# Patient Record
Sex: MALE | Race: WHITE | NOT HISPANIC OR LATINO | URBAN - METROPOLITAN AREA
[De-identification: names, ages, dates, MRNs, and addresses within clinical notes are randomized per-mention and may not be internally consistent; named-entity substitution may affect disease eponyms.]

---

## 2022-01-13 PROCEDURE — U0004 COV-19 TEST NON-CDC HGH THRU: HCPCS | Performed by: PHYSICIAN ASSISTANT

## 2022-11-28 ENCOUNTER — OFFICE (OUTPATIENT)
Dept: URBAN - METROPOLITAN AREA CLINIC 76 | Facility: CLINIC | Age: 42
End: 2022-11-28
Payer: OTHER GOVERNMENT

## 2022-11-28 VITALS
DIASTOLIC BLOOD PRESSURE: 72 MMHG | HEART RATE: 54 BPM | SYSTOLIC BLOOD PRESSURE: 124 MMHG | WEIGHT: 206 LBS | HEIGHT: 71 IN

## 2022-11-28 DIAGNOSIS — R10.13 EPIGASTRIC PAIN: ICD-10-CM

## 2022-11-28 DIAGNOSIS — K21.00 GASTRO-ESOPHAGEAL REFLUX DISEASE WITH ESOPHAGITIS, WITHOUT B: ICD-10-CM

## 2022-11-28 PROCEDURE — 99204 OFFICE O/P NEW MOD 45 MIN: CPT | Performed by: INTERNAL MEDICINE

## 2022-11-28 RX ORDER — PANTOPRAZOLE SODIUM 40 MG/1
40 TABLET, DELAYED RELEASE ORAL
Qty: 180 | Refills: 3 | Status: ACTIVE

## 2022-12-22 ENCOUNTER — ON CAMPUS - OUTPATIENT (OUTPATIENT)
Dept: URBAN - METROPOLITAN AREA HOSPITAL 108 | Facility: HOSPITAL | Age: 42
End: 2022-12-22
Payer: OTHER GOVERNMENT

## 2022-12-22 DIAGNOSIS — R10.13 EPIGASTRIC PAIN: ICD-10-CM

## 2022-12-22 DIAGNOSIS — K29.70 GASTRITIS, UNSPECIFIED, WITHOUT BLEEDING: ICD-10-CM

## 2022-12-22 PROCEDURE — 43239 EGD BIOPSY SINGLE/MULTIPLE: CPT | Performed by: INTERNAL MEDICINE

## 2023-03-06 ENCOUNTER — OFFICE (OUTPATIENT)
Dept: URBAN - METROPOLITAN AREA CLINIC 76 | Facility: CLINIC | Age: 43
End: 2023-03-06
Payer: OTHER GOVERNMENT

## 2023-03-06 VITALS
WEIGHT: 214 LBS | DIASTOLIC BLOOD PRESSURE: 73 MMHG | SYSTOLIC BLOOD PRESSURE: 123 MMHG | HEART RATE: 60 BPM | HEIGHT: 71 IN

## 2023-03-06 DIAGNOSIS — K21.9 GASTRO-ESOPHAGEAL REFLUX DISEASE WITHOUT ESOPHAGITIS: ICD-10-CM

## 2023-03-06 DIAGNOSIS — R19.7 DIARRHEA, UNSPECIFIED: ICD-10-CM

## 2023-03-06 DIAGNOSIS — R10.13 EPIGASTRIC PAIN: ICD-10-CM

## 2023-03-06 PROCEDURE — 99214 OFFICE O/P EST MOD 30 MIN: CPT | Performed by: INTERNAL MEDICINE

## 2023-06-07 ENCOUNTER — OFFICE (OUTPATIENT)
Dept: URBAN - METROPOLITAN AREA CLINIC 76 | Facility: CLINIC | Age: 43
End: 2023-06-07
Payer: OTHER GOVERNMENT

## 2023-06-07 VITALS
DIASTOLIC BLOOD PRESSURE: 77 MMHG | SYSTOLIC BLOOD PRESSURE: 120 MMHG | HEART RATE: 51 BPM | OXYGEN SATURATION: 97 % | HEIGHT: 71 IN | WEIGHT: 212 LBS

## 2023-06-07 DIAGNOSIS — R10.13 EPIGASTRIC PAIN: ICD-10-CM

## 2023-06-07 DIAGNOSIS — K58.0 IRRITABLE BOWEL SYNDROME WITH DIARRHEA: ICD-10-CM

## 2023-06-07 DIAGNOSIS — K21.9 GASTRO-ESOPHAGEAL REFLUX DISEASE WITHOUT ESOPHAGITIS: ICD-10-CM

## 2023-06-07 PROCEDURE — 99214 OFFICE O/P EST MOD 30 MIN: CPT

## 2023-06-07 RX ORDER — RIFAXIMIN 550 MG/1
1650 TABLET ORAL
Qty: 42 | Refills: 2 | Status: COMPLETED
Start: 2023-06-07 | End: 2023-08-29

## 2023-08-29 ENCOUNTER — OFFICE (OUTPATIENT)
Dept: URBAN - METROPOLITAN AREA CLINIC 76 | Facility: CLINIC | Age: 43
End: 2023-08-29
Payer: OTHER GOVERNMENT

## 2023-08-29 VITALS
HEIGHT: 71 IN | HEART RATE: 60 BPM | OXYGEN SATURATION: 97 % | DIASTOLIC BLOOD PRESSURE: 80 MMHG | SYSTOLIC BLOOD PRESSURE: 120 MMHG | WEIGHT: 193 LBS

## 2023-08-29 DIAGNOSIS — K21.9 GASTRO-ESOPHAGEAL REFLUX DISEASE WITHOUT ESOPHAGITIS: ICD-10-CM

## 2023-08-29 DIAGNOSIS — K58.0 IRRITABLE BOWEL SYNDROME WITH DIARRHEA: ICD-10-CM

## 2023-08-29 PROCEDURE — 99214 OFFICE O/P EST MOD 30 MIN: CPT

## 2023-08-29 RX ORDER — DICYCLOMINE HYDROCHLORIDE 10 MG/1
CAPSULE ORAL
Qty: 270 | Refills: 3 | Status: ACTIVE
Start: 2023-08-29

## 2023-10-27 ENCOUNTER — OFFICE VISIT (OUTPATIENT)
Dept: CARDIOLOGY | Facility: CLINIC | Age: 43
End: 2023-10-27
Payer: OTHER GOVERNMENT

## 2023-10-27 VITALS
HEIGHT: 71 IN | DIASTOLIC BLOOD PRESSURE: 82 MMHG | HEART RATE: 44 BPM | BODY MASS INDEX: 26.99 KG/M2 | SYSTOLIC BLOOD PRESSURE: 119 MMHG | WEIGHT: 192.8 LBS

## 2023-10-27 DIAGNOSIS — E78.01 FAMILIAL HYPERCHOLESTEREMIA: Primary | ICD-10-CM

## 2023-10-27 DIAGNOSIS — R00.1 BRADYCARDIA, SINUS: ICD-10-CM

## 2023-10-27 DIAGNOSIS — Z82.49 FAMILY HISTORY OF ISCHEMIC HEART DISEASE (IHD): ICD-10-CM

## 2023-10-27 PROCEDURE — 99204 OFFICE O/P NEW MOD 45 MIN: CPT | Performed by: INTERNAL MEDICINE

## 2023-10-27 PROCEDURE — 93000 ELECTROCARDIOGRAM COMPLETE: CPT | Performed by: INTERNAL MEDICINE

## 2023-10-27 RX ORDER — BUSPIRONE HYDROCHLORIDE 10 MG/1
TABLET ORAL
COMMUNITY
Start: 2023-08-30

## 2023-10-27 RX ORDER — DICLOFENAC SODIUM 75 MG/1
TABLET, DELAYED RELEASE ORAL
COMMUNITY
Start: 2023-09-11

## 2023-10-27 RX ORDER — HYDROXYZINE HYDROCHLORIDE 10 MG/1
10 TABLET, FILM COATED ORAL
COMMUNITY
Start: 2022-10-28 | End: 2023-10-28

## 2023-10-27 RX ORDER — VALACYCLOVIR HYDROCHLORIDE 1 G/1
1000 TABLET, FILM COATED ORAL
COMMUNITY
Start: 2022-11-15 | End: 2023-11-15

## 2023-10-27 RX ORDER — CARBOXYMETHYLCELLULOSE SODIUM 5 MG/ML
SOLUTION/ DROPS OPHTHALMIC
COMMUNITY
Start: 2023-10-25

## 2023-10-27 RX ORDER — SILDENAFIL 100 MG/1
TABLET, FILM COATED ORAL
COMMUNITY
Start: 2023-07-28

## 2023-10-27 RX ORDER — PANTOPRAZOLE SODIUM 40 MG/1
1 TABLET, DELAYED RELEASE ORAL
COMMUNITY
Start: 2022-11-28 | End: 2023-11-28

## 2023-10-27 RX ORDER — GABAPENTIN 600 MG/1
TABLET ORAL
COMMUNITY
Start: 2023-09-15 | End: 2023-10-27

## 2023-10-27 RX ORDER — TRAZODONE HYDROCHLORIDE 50 MG/1
TABLET ORAL
COMMUNITY
Start: 2023-08-30

## 2023-10-27 RX ORDER — HYDROCODONE BITARTRATE AND ACETAMINOPHEN 5; 325 MG/1; MG/1
TABLET ORAL
COMMUNITY
Start: 2023-10-16

## 2023-10-27 RX ORDER — ALBUTEROL SULFATE 90 UG/1
AEROSOL, METERED RESPIRATORY (INHALATION)
COMMUNITY
Start: 2022-11-15 | End: 2023-11-15

## 2023-10-27 RX ORDER — MELOXICAM 15 MG/1
TABLET ORAL
COMMUNITY
Start: 2023-09-15

## 2023-10-27 RX ORDER — DICYCLOMINE HYDROCHLORIDE 10 MG/1
CAPSULE ORAL
COMMUNITY
Start: 2023-08-30

## 2023-10-27 NOTE — PROGRESS NOTES
Chief Complaint  Irregular Heart Beat and Hyperlipidemia      History of Present Illness  Carlitos Jordan presents to [facility] CARDIOLOGY    This is a very pleasant 43-year-old gentleman with past medical history significant for family history of ischemic heart disease, familial hypercholesterolemia, bradycardia, presents to clinic for cardiac evaluation.  He has had elevated cholesterol numbers with LDL exceeding 270.  He could not tolerate several statins and ezetimibe in the past due to side effects.  He is feeling well cardiac wise.  He has no chest discomfort or significant dyspnea.  There is no palpitations, dizziness, presyncope or syncope.    Past Medical History:   Diagnosis Date   • Hyperlipidemia          Current Outpatient Medications:   •  albuterol sulfate  (90 Base) MCG/ACT inhaler, Inhale., Disp: , Rfl:   •  amphetamine-dextroamphetamine XR (ADDERALL XR) 10 MG 24 hr capsule, Take 1 capsule by mouth Every Morning, Disp: , Rfl:   •  busPIRone (BUSPAR) 10 MG tablet, , Disp: , Rfl:   •  diclofenac (VOLTAREN) 75 MG EC tablet, , Disp: , Rfl:   •  dicyclomine (BENTYL) 10 MG capsule, , Disp: , Rfl:   •  fluticasone (FLONASE) 50 MCG/ACT nasal spray, 2 sprays into the nostril(s) as directed by provider Daily., Disp: 18.2 mL, Rfl: 0  •  fluticasone-salmeterol (ADVAIR) 100-50 MCG/DOSE DISKUS, Inhale 2 (Two) Times a Day., Disp: , Rfl:   •  HYDROcodone-acetaminophen (NORCO) 5-325 MG per tablet, , Disp: , Rfl:   •  hydrOXYzine (ATARAX) 10 MG tablet, Take 1 tablet by mouth., Disp: , Rfl:   •  Lyrica 100 MG capsule, , Disp: , Rfl:   •  meloxicam (MOBIC) 15 MG tablet, , Disp: , Rfl:   •  ondansetron ODT (ZOFRAN-ODT) 4 MG disintegrating tablet, Place 1 tablet on the tongue Every 8 (Eight) Hours As Needed for Nausea or Vomiting., Disp: 20 tablet, Rfl: 0  •  pantoprazole (PROTONIX) 40 MG EC tablet, Take 1 tablet by mouth., Disp: , Rfl:   •  Refresh Plus 0.5 % solution, , Disp: , Rfl:   •  sertraline  "(ZOLOFT) 50 MG tablet, , Disp: , Rfl:   •  sildenafil (VIAGRA) 100 MG tablet, , Disp: , Rfl:   •  traZODone (DESYREL) 50 MG tablet, , Disp: , Rfl:   •  valACYclovir (VALTREX) 1000 MG tablet, Take 1 tablet by mouth., Disp: , Rfl:     Current Facility-Administered Medications:   •  Evolocumab (REPATHA) injection 140 mg, 140 mg, Subcutaneous, Q14 Days, Tyrese Reddy MD    Medications Discontinued During This Encounter   Medication Reason   • atorvastatin (LIPITOR) 10 MG tablet *Therapy completed   • brompheniramine-pseudoephedrine-DM 30-2-10 MG/5ML syrup *Therapy completed   • gabapentin (NEURONTIN) 600 MG tablet *Therapy completed   • loperamide (IMODIUM) 2 MG capsule *Therapy completed     Allergies   Allergen Reactions   • Statins Other (See Comments)     Body aches, brain fog        Social History     Tobacco Use   • Smoking status: Never   • Smokeless tobacco: Never   Substance Use Topics   • Alcohol use: Yes     Comment: occ       Family History   Problem Relation Age of Onset   • Hyperlipidemia Father    • Heart attack Paternal Grandfather         Objective     /82   Pulse (!) 44   Ht 180.3 cm (71\")   Wt 87.5 kg (192 lb 12.8 oz)   BMI 26.89 kg/m²       Physical Exam  Constitutional:       General: Awake. Not in acute distress.     Appearance: Normal appearance.   Neck:      Vascular: No carotid bruit, hepatojugular reflux or JVD.   Cardiovascular:      Rate and Rhythm: Normal rate and regular rhythm.      Chest Wall: PMI is not displaced.      Heart sounds: Normal heart sounds, S1 normal and S2 normal. No murmur heard.   No friction rub. No gallop. No S3 or S4 sounds.    Pulmonary:      Effort: Pulmonary effort is normal.      Breath sounds: Normal breath sounds. No wheezing, rhonchi or rales.   Ext.      Right lower leg: No edema.      Left lower leg: No edema.   Skin:     General: Skin is warm and dry.      Coloration: Skin is not cyanotic.      Findings: No petechiae or rash.   Neurological:    " "  Mental Status: Alert and oriented x 3  Psychiatric:         Behavior: Behavior is cooperative.       Result Review :     No results found for: \"PROBNP\"       No results found for: \"TSH\"   No results found for: \"FREET4\"   No results found for: \"DDIMERQUANT\"  No results found for: \"MG\"   No results found for: \"DIGOXIN\"   No results found for: \"TROPONINT\"   No results found for: \"POCTROP\"(              ECG 12 Lead    Date/Time: 10/27/2023 12:44 PM  Performed by: Tyrese Reddy MD    Authorized by: Tyrese Reddy MD  Comparison: not compared with previous ECG   Rhythm: sinus bradycardia  BPM: 42          No results found for this or any previous visit.                Diagnoses and all orders for this visit:    1. Familial hypercholesteremia (Primary)  -     CT Cardiac Calcium Score Without Dye; Future  -     Treadmill Stress Test; Future  -     Evolocumab (REPATHA) injection 140 mg  -     Lipid Panel; Future    2. Bradycardia, sinus  -     Holter Monitor - 48 Hour; Future    3. Family history of ischemic heart disease (IHD)      Assessment:    Familial hypercholesterolemia: Recent blood work was noted.  He has had elevated cholesterol numbers with LDL exceeding 270 suggestive of familial hypercholesterolemia.  He has family history of ischemic heart disease.  He could not tolerate several statins or ezetimibe in the past due to side effects.  He will be started on Repatha.  I will repeat lipid profile in 2 to 3 months for follow-up.  Lifestyle changes and primary CAD risk factor modification were discussed.    Family history of ischemic heart disease: We will be initiated on Repatha as discussed above.  In addition, treadmill stress test and coronary calcium score will be checked for further risk stratification.    Sinus bradycardia: Today's ECG shows heart rate of 42 bpm.  He is asymptomatic without significant dizziness, shortness of breath, fatigue or other cardiac symptoms.  I will check a 48-hour Holter " monitor to assess average heart rate and to rule out significant bradycardia.  Treadmill stress test will be done to assess chronotropic response.  Recent thyroid function test was within normal limits.  Further recommendations to follow.    Follow Up       Return for Return to clinic after diagnostic testing, With Pilar DUTTA.    Patient was given instructions and counseling regarding his condition or for health maintenance advice. Please see specific information pulled into the AVS if appropriate.

## 2023-11-03 ENCOUNTER — TELEPHONE (OUTPATIENT)
Dept: CARDIOLOGY | Facility: CLINIC | Age: 43
End: 2023-11-03

## 2023-11-03 NOTE — TELEPHONE ENCOUNTER
Hub staff attempted to follow warm transfer process and was unsuccessful     Caller: Carlitos Jordan    Relationship to patient: Self    Best call back number:[phone] , OKAY TO LEAVE VOICEMAIL    Patient is needing: TO RESCHEUDLED YOUR HOLTER MONITOR APPT DUE TO HAVING THE FLU

## 2023-11-09 ENCOUNTER — TELEPHONE (OUTPATIENT)
Dept: CARDIOLOGY | Facility: CLINIC | Age: 43
End: 2023-11-09
Payer: OTHER GOVERNMENT

## 2023-11-09 DIAGNOSIS — E78.01 FAMILIAL HYPERCHOLESTEREMIA: ICD-10-CM

## 2023-11-13 ENCOUNTER — PATIENT MESSAGE (OUTPATIENT)
Dept: CARDIOLOGY | Facility: CLINIC | Age: 43
End: 2023-11-13
Payer: OTHER GOVERNMENT

## 2023-11-13 NOTE — TELEPHONE ENCOUNTER
"This prescription was not sent to the pharmacy due to being sent under \"clinic administered medication\"  "

## 2023-11-14 ENCOUNTER — HOSPITAL ENCOUNTER (OUTPATIENT)
Dept: CT IMAGING | Facility: HOSPITAL | Age: 43
Discharge: HOME | End: 2023-11-14
Admitting: INTERNAL MEDICINE

## 2023-11-14 ENCOUNTER — TELEPHONE (OUTPATIENT)
Dept: CARDIOLOGY | Facility: CLINIC | Age: 43
End: 2023-11-14
Payer: OTHER GOVERNMENT

## 2023-11-14 DIAGNOSIS — E78.01 FAMILIAL HYPERCHOLESTEREMIA: ICD-10-CM

## 2023-11-14 PROCEDURE — 75571 CT HRT W/O DYE W/CA TEST: CPT

## 2023-11-15 NOTE — TELEPHONE ENCOUNTER
TYLER RODRÍGUEZ APPROVED    CaseId:[id number];Status:Approved;Review Type:Prior Auth;Coverage Start Date:10/15/2023;Coverage End Date:12/31/2099

## 2023-11-29 ENCOUNTER — OFFICE (OUTPATIENT)
Dept: URBAN - METROPOLITAN AREA CLINIC 76 | Facility: CLINIC | Age: 43
End: 2023-11-29
Payer: OTHER GOVERNMENT

## 2023-11-29 VITALS — WEIGHT: 194 LBS | HEIGHT: 71 IN

## 2023-11-29 DIAGNOSIS — K21.9 GASTRO-ESOPHAGEAL REFLUX DISEASE WITHOUT ESOPHAGITIS: ICD-10-CM

## 2023-11-29 DIAGNOSIS — K58.9 IRRITABLE BOWEL SYNDROME WITHOUT DIARRHEA: ICD-10-CM

## 2023-11-29 PROCEDURE — 99213 OFFICE O/P EST LOW 20 MIN: CPT

## 2024-01-03 ENCOUNTER — OFFICE VISIT (OUTPATIENT)
Dept: CARDIOLOGY | Facility: CLINIC | Age: 44
End: 2024-01-03
Payer: OTHER GOVERNMENT

## 2024-01-03 VITALS
SYSTOLIC BLOOD PRESSURE: 112 MMHG | HEIGHT: 71 IN | BODY MASS INDEX: 28.19 KG/M2 | DIASTOLIC BLOOD PRESSURE: 74 MMHG | WEIGHT: 201.4 LBS | HEART RATE: 76 BPM

## 2024-01-03 DIAGNOSIS — Z82.49 FAMILY HISTORY OF ISCHEMIC HEART DISEASE (IHD): ICD-10-CM

## 2024-01-03 DIAGNOSIS — R00.1 BRADYCARDIA, SINUS: ICD-10-CM

## 2024-01-03 DIAGNOSIS — E78.01 FAMILIAL HYPERCHOLESTEREMIA: Primary | ICD-10-CM

## 2024-01-03 DIAGNOSIS — Z78.9 STATIN INTOLERANCE: ICD-10-CM

## 2024-01-03 PROCEDURE — 99214 OFFICE O/P EST MOD 30 MIN: CPT

## 2024-01-03 RX ORDER — PANTOPRAZOLE SODIUM 40 MG/1
40 TABLET, DELAYED RELEASE ORAL 2 TIMES DAILY
COMMUNITY

## 2024-01-03 RX ORDER — HYDROXYZINE HYDROCHLORIDE 25 MG/1
25 TABLET, FILM COATED ORAL AS NEEDED
COMMUNITY
Start: 2023-12-11

## 2024-01-03 RX ORDER — ZOLMITRIPTAN 5 MG/1
5 TABLET, FILM COATED ORAL AS NEEDED
COMMUNITY
Start: 2023-10-30

## 2024-01-03 RX ORDER — TAMSULOSIN HYDROCHLORIDE 0.4 MG/1
1 CAPSULE ORAL DAILY
COMMUNITY

## 2024-01-03 NOTE — PROGRESS NOTES
Chief Complaint  Familial hypercholesteremia and Follow-up (F/U post testing results.  Testing is complete. )    Subjective        History of Present Illness  Carlitos Jordan presents to [facility] CARDIOLOGY for follow up.  Mr. Jordan is a 43-year-old male with past medical history outlined below, significant for familial hypercholesterolemia and family history of ischemic heart disease who presents for follow-up on recent testing.  He has no symptoms from a cardiac standpoint.  He denies any chest pain or discomfort, dyspnea, orthopnea, edema, dizziness, lightheadedness or syncope.      Past Medical History:   Diagnosis Date    Asthma 2008    I have asthma    Hyperlipidemia     Sleep apnea 2022    I have sleep apnea       ALLERGY  Allergies   Allergen Reactions    Statins Other (See Comments)     Body aches, brain fog        History reviewed. No pertinent surgical history.     Social History     Socioeconomic History    Marital status:    Tobacco Use    Smoking status: Never    Smokeless tobacco: Never   Vaping Use    Vaping Use: Never used   Substance and Sexual Activity    Alcohol use: Not Currently     Comment: occ    Drug use: Never    Sexual activity: Yes     Partners: Female     Birth control/protection: Vasectomy       Family History   Problem Relation Age of Onset    Hyperlipidemia Father     Heart attack Paternal Grandfather         Current Outpatient Medications on File Prior to Visit   Medication Sig    amphetamine-dextroamphetamine XR (ADDERALL XR) 10 MG 24 hr capsule Take 1 capsule by mouth Every Morning    busPIRone (BUSPAR) 10 MG tablet     diclofenac (VOLTAREN) 75 MG EC tablet     dicyclomine (BENTYL) 10 MG capsule     Evolocumab (REPATHA) solution auto-injector SureClick injection Inject 1 mL under the skin into the appropriate area as directed Every 14 (Fourteen) Days.    fluticasone (FLONASE) 50 MCG/ACT nasal spray 2 sprays into the nostril(s) as directed by provider Daily.     "fluticasone-salmeterol (ADVAIR) 100-50 MCG/DOSE DISKUS Inhale 2 (Two) Times a Day.    HYDROcodone-acetaminophen (NORCO) 5-325 MG per tablet     hydrOXYzine (ATARAX) 25 MG tablet Take 1 tablet by mouth As Needed.    Lyrica 100 MG capsule     meloxicam (MOBIC) 15 MG tablet     ondansetron ODT (ZOFRAN-ODT) 4 MG disintegrating tablet Place 1 tablet on the tongue Every 8 (Eight) Hours As Needed for Nausea or Vomiting.    pantoprazole (PROTONIX) 40 MG EC tablet Take 1 tablet by mouth 2 (Two) Times a Day.    Refresh Plus 0.5 % solution     sertraline (ZOLOFT) 50 MG tablet     sildenafil (VIAGRA) 100 MG tablet     tamsulosin (Flomax) 0.4 MG capsule 24 hr capsule Take 1 capsule by mouth Daily.    traZODone (DESYREL) 50 MG tablet     ZOLMitriptan (ZOMIG) 5 MG tablet Take 1 tablet by mouth As Needed.     No current facility-administered medications on file prior to visit.       Objective   Vitals:    01/03/24 0952   BP: 112/74   Pulse: 76   Weight: 91.4 kg (201 lb 6.4 oz)   Height: 180.3 cm (71\")       Physical Exam  Constitutional:       General: He is awake. He is not in acute distress.     Appearance: Normal appearance.   HENT:      Head: Normocephalic.      Nose: Nose normal. No congestion.   Eyes:      Extraocular Movements: Extraocular movements intact.      Conjunctiva/sclera: Conjunctivae normal.      Pupils: Pupils are equal, round, and reactive to light.   Neck:      Thyroid: No thyromegaly.      Vascular: No JVD.   Cardiovascular:      Rate and Rhythm: Normal rate and regular rhythm.      Chest Wall: PMI is not displaced.      Pulses: Normal pulses.      Heart sounds: Normal heart sounds, S1 normal and S2 normal. No murmur heard.     No friction rub. No gallop. No S3 or S4 sounds.   Pulmonary:      Effort: Pulmonary effort is normal.      Breath sounds: Normal breath sounds. No wheezing, rhonchi or rales.   Abdominal:      General: Bowel sounds are normal.      Palpations: Abdomen is soft.      Tenderness: There is " "no abdominal tenderness.   Musculoskeletal:      Cervical back: No tenderness.      Right lower leg: No edema.      Left lower leg: No edema.   Lymphadenopathy:      Cervical: No cervical adenopathy.   Skin:     General: Skin is warm and dry.      Capillary Refill: Capillary refill takes less than 2 seconds.      Coloration: Skin is not cyanotic.      Findings: No petechiae or rash.      Nails: There is no clubbing.   Neurological:      Mental Status: He is alert.   Psychiatric:         Mood and Affect: Mood normal.         Behavior: Behavior is cooperative.           Result Review     The following data was reviewed by LUZMARIA Goldberg on 01/03/24.    No results found for: \"PROBNP\"             Results for orders placed in visit on 12/13/23    Treadmill Stress Test    Interpretation Summary  Adequate aerobic functional capacity.  Maximum workload achieved was 13 METS.  Normal blood pressure and heart rate response to exercise.  No significant arrhythmias were noted.  Baseline ECG shows normal sinus rhythm.  At peak stress, no ischemic ST-T changes were noted.  Impressions are consistent with low risk study.         Procedures    Assessment & Plan  Diagnoses and all orders for this visit:    1. Familial hypercholesteremia (Primary)  -     Lipid Panel; Future    2. Family history of ischemic heart disease (IHD)    3. Bradycardia, sinus    4. Statin intolerance      1.  Patient has been tolerating Repatha.  He is intolerant to statins and Zetia.  Lipid panel will be checked today.  Further recommendations pending results.  In the meantime continue Repatha.  2.  Recent coronary calcium score demonstrated a total calcium score of 0.  Patient was reassured.  Recent stress test was low risk.  Continue primary risk factor modification.  3.  Patient was found to be bradycardic on most recent office visit.  48-hour Holter monitor was completed.  Results demonstrated an average heart rate of 60 which can get as low as " 37 while he is sleeping an appropriate compensatory mechanism with a heart rate getting up to 145 with exertion.  4.  Continue Repatha.  Follow Up   Return in about 6 months (around 7/3/2024) for With .    Patient was given instructions and counseling regarding his condition or for health maintenance advice. Please see specific information pulled into the AVS if appropriate.     Pilar Juarez, LUZMARIA  01/03/24  10:25 EST    Dictated Utilizing Dragon Dictation

## 2024-01-29 ENCOUNTER — TRANSCRIBE ORDERS (OUTPATIENT)
Dept: ADMINISTRATIVE | Facility: HOSPITAL | Age: 44
End: 2024-01-29
Payer: OTHER GOVERNMENT

## 2024-01-29 DIAGNOSIS — R91.8 LUNG MASS: Primary | ICD-10-CM

## 2024-02-19 ENCOUNTER — OFFICE (OUTPATIENT)
Dept: URBAN - METROPOLITAN AREA CLINIC 76 | Facility: CLINIC | Age: 44
End: 2024-02-19
Payer: OTHER GOVERNMENT

## 2024-02-19 VITALS
SYSTOLIC BLOOD PRESSURE: 122 MMHG | HEART RATE: 51 BPM | OXYGEN SATURATION: 99 % | HEIGHT: 71 IN | WEIGHT: 186 LBS | DIASTOLIC BLOOD PRESSURE: 78 MMHG

## 2024-02-19 DIAGNOSIS — K58.0 IRRITABLE BOWEL SYNDROME WITH DIARRHEA: ICD-10-CM

## 2024-02-19 DIAGNOSIS — K21.9 GASTRO-ESOPHAGEAL REFLUX DISEASE WITHOUT ESOPHAGITIS: ICD-10-CM

## 2024-02-19 DIAGNOSIS — R10.13 EPIGASTRIC PAIN: ICD-10-CM

## 2024-02-19 PROCEDURE — 99214 OFFICE O/P EST MOD 30 MIN: CPT

## 2024-02-19 RX ORDER — PANTOPRAZOLE SODIUM 40 MG/1
80 TABLET, DELAYED RELEASE ORAL
Qty: 180 | Refills: 3 | Status: ACTIVE
Start: 2024-02-19

## 2024-02-19 RX ORDER — SUCRALFATE 1 G/1
4 TABLET ORAL
Qty: 120 | Refills: 5 | Status: ACTIVE
Start: 2024-02-19

## 2024-04-10 ENCOUNTER — HOSPITAL ENCOUNTER (OUTPATIENT)
Facility: HOSPITAL | Age: 44
Discharge: HOME | End: 2024-04-10
Admitting: INTERNAL MEDICINE
Payer: OTHER GOVERNMENT

## 2024-04-10 DIAGNOSIS — R91.8 LUNG MASS: ICD-10-CM

## 2024-04-10 PROCEDURE — 71250 CT THORAX DX C-: CPT

## 2025-03-06 ENCOUNTER — PATIENT ROUNDING (BHMG ONLY) (OUTPATIENT)
Dept: FAMILY MEDICINE CLINIC | Facility: CLINIC | Age: 45
End: 2025-03-06
Payer: OTHER GOVERNMENT

## 2025-03-06 ENCOUNTER — OFFICE VISIT (OUTPATIENT)
Dept: FAMILY MEDICINE CLINIC | Facility: CLINIC | Age: 45
End: 2025-03-06
Payer: OTHER GOVERNMENT

## 2025-03-06 VITALS
DIASTOLIC BLOOD PRESSURE: 70 MMHG | HEART RATE: 61 BPM | BODY MASS INDEX: 27.75 KG/M2 | TEMPERATURE: 98 F | WEIGHT: 198.2 LBS | HEIGHT: 71 IN | SYSTOLIC BLOOD PRESSURE: 100 MMHG | OXYGEN SATURATION: 96 %

## 2025-03-06 DIAGNOSIS — E78.5 HYPERLIPIDEMIA, UNSPECIFIED HYPERLIPIDEMIA TYPE: ICD-10-CM

## 2025-03-06 DIAGNOSIS — J02.9 SORE THROAT: ICD-10-CM

## 2025-03-06 DIAGNOSIS — R05.2 SUBACUTE COUGH: Primary | ICD-10-CM

## 2025-03-06 PROCEDURE — 99214 OFFICE O/P EST MOD 30 MIN: CPT | Performed by: NURSE PRACTITIONER

## 2025-03-06 RX ORDER — ZOLPIDEM TARTRATE 5 MG/1
5 TABLET ORAL NIGHTLY PRN
COMMUNITY

## 2025-03-06 NOTE — PROGRESS NOTES
"Chief Complaint  Establish Care    Subjective        Carlitos Jordan presents to [facility] PRIMARY CARE  History of Present Illness  Patient is here to establish care. He recently retired from the [place] [employer]. [facility] and he meets with [facility] next week. He had recent labs. He did recently stop taking a majority of his medications when he retired.     His history includes PTSD, MDD, anxiety, adhd, migraines, lower back pain and sciatica, DDD cervical and lumbar. Ulnar neuropathy right arm with unsuccessful surgery. Bilateral carpal tunnel. Raynauds syndrome. Plantar fasciitis. Bilateral knee pain and osteoarthritis. JAVED, hyperlipidemia, elevated bp, gerd, asthma, vitamin d def. Chronic ED. Prostatitis.     Patient is concerned for night time cough, which starts around 7pm in the evening. It is unproductive. He describes it as 'swallowing glass'. He states the asthma medications do not help. Does not feel short of breath. At one time he had changed diet severely as he was having to take protonix twice daily. He has also noticed some ear pain and occasionally has tinnitus.     Declines vaccination today.    [facility]. He will try to send lab results via email.   Objective   Vital Signs:  /70 (BP Location: Left arm, Patient Position: Sitting, Cuff Size: Large Adult)   Pulse 61   Temp 98 °F (36.7 °C) (Temporal)   Ht 180.3 cm (71\")   Wt 89.9 kg (198 lb 3.2 oz)   SpO2 96%   BMI 27.64 kg/m²   Estimated body mass index is 27.64 kg/m² as calculated from the following:    Height as of this encounter: 180.3 cm (71\").    Weight as of this encounter: 89.9 kg (198 lb 3.2 oz).    BMI is >= 25 and <30. (Overweight) The following options were offered after discussion;: weight loss educational material (shared in after visit summary) and Information on healthy weight added to patient's after visit summary.      Physical Exam  Constitutional:       " Appearance: Normal appearance. He is normal weight.   HENT:      Head: Normocephalic.      Right Ear: A middle ear effusion is present.      Left Ear: A middle ear effusion is present.      Nose: Nose normal.      Mouth/Throat:      Mouth: Mucous membranes are dry.      Pharynx: Posterior oropharyngeal erythema present.   Eyes:      Extraocular Movements: Extraocular movements intact.      Pupils: Pupils are equal, round, and reactive to light.   Cardiovascular:      Rate and Rhythm: Normal rate and regular rhythm.      Heart sounds: Normal heart sounds.   Pulmonary:      Effort: Pulmonary effort is normal.      Breath sounds: Normal breath sounds.   Musculoskeletal:         General: Normal range of motion.      Cervical back: Normal range of motion.   Skin:     General: Skin is warm and dry.   Neurological:      General: No focal deficit present.      Mental Status: He is alert and oriented to person, place, and time.   Psychiatric:         Mood and Affect: Mood normal.        Result Review :                Assessment and Plan   Diagnoses and all orders for this visit:    1. Subacute cough (Primary)  -     Ambulatory Referral to ENT (Otolaryngology); Future    2. Sore throat  -     Ambulatory Referral to ENT (Otolaryngology); Future    3. Hyperlipidemia, unspecified hyperlipidemia type  -     Evolocumab (REPATHA) solution auto-injector SureClick injection; Inject 1 mL under the skin into the appropriate area as directed Every 14 (Fourteen) Days.  Dispense: 2 mL; Refill: 3       otc claritin or zyrtec to take daily.        Follow Up   Return if symptoms worsen or fail to improve.  Patient was given instructions and counseling regarding his condition or for health maintenance advice. Please see specific information pulled into the AVS if appropriate.

## 2025-06-27 ENCOUNTER — OFFICE VISIT (OUTPATIENT)
Dept: FAMILY MEDICINE CLINIC | Facility: CLINIC | Age: 45
End: 2025-06-27
Payer: OTHER GOVERNMENT

## 2025-06-27 VITALS
WEIGHT: 187.4 LBS | DIASTOLIC BLOOD PRESSURE: 80 MMHG | SYSTOLIC BLOOD PRESSURE: 110 MMHG | TEMPERATURE: 98.6 F | BODY MASS INDEX: 26.23 KG/M2 | HEIGHT: 71 IN | HEART RATE: 56 BPM | OXYGEN SATURATION: 98 %

## 2025-06-27 DIAGNOSIS — R53.83 OTHER FATIGUE: Primary | ICD-10-CM

## 2025-06-27 DIAGNOSIS — F43.10 POST TRAUMATIC STRESS DISORDER: ICD-10-CM

## 2025-06-27 DIAGNOSIS — N52.9 ERECTILE DYSFUNCTION, UNSPECIFIED ERECTILE DYSFUNCTION TYPE: ICD-10-CM

## 2025-06-27 DIAGNOSIS — R26.89 BALANCE PROBLEM: ICD-10-CM

## 2025-06-27 DIAGNOSIS — E78.5 HYPERLIPIDEMIA, UNSPECIFIED HYPERLIPIDEMIA TYPE: ICD-10-CM

## 2025-06-27 DIAGNOSIS — Z11.59 ENCOUNTER FOR HEPATITIS C SCREENING TEST FOR LOW RISK PATIENT: ICD-10-CM

## 2025-06-27 DIAGNOSIS — R41.3 MEMORY IMPAIRMENT: ICD-10-CM

## 2025-06-27 PROCEDURE — 99214 OFFICE O/P EST MOD 30 MIN: CPT | Performed by: NURSE PRACTITIONER

## 2025-06-27 RX ORDER — TADALAFIL 10 MG/1
10 TABLET ORAL DAILY PRN
Qty: 90 TABLET | Refills: 1 | Status: SHIPPED | OUTPATIENT
Start: 2025-06-27

## 2025-06-27 RX ORDER — TADALAFIL 10 MG/1
10 TABLET ORAL DAILY PRN
COMMUNITY
End: 2025-06-27 | Stop reason: SDUPTHER
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
